# Patient Record
Sex: MALE | Race: WHITE
[De-identification: names, ages, dates, MRNs, and addresses within clinical notes are randomized per-mention and may not be internally consistent; named-entity substitution may affect disease eponyms.]

---

## 2019-11-13 ENCOUNTER — HOSPITAL ENCOUNTER (EMERGENCY)
Dept: HOSPITAL 7 - FB.ED | Age: 29
Discharge: HOME | End: 2019-11-13
Payer: COMMERCIAL

## 2019-11-13 VITALS — DIASTOLIC BLOOD PRESSURE: 84 MMHG | SYSTOLIC BLOOD PRESSURE: 138 MMHG | HEART RATE: 74 BPM

## 2019-11-13 DIAGNOSIS — S80.01XA: Primary | ICD-10-CM
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
## 2019-11-13 NOTE — EDM.PDOC
ED HPI GENERAL MEDICAL PROBLEM





- General


Stated Complaint: KNEE ISSUE


Time Seen by Provider: 11/13/19 23:00


Source of Information: Reports: Patient


History Limitations: Reports: No Limitations





- History of Present Illness


INITIAL COMMENTS - FREE TEXT/NARRATIVE: 


29 you male with pain,tightness and swelling of the knee,since Saturday. he had 

been playing around Friday night. he has also noted discoloration to the back 

of the knee and he is worried about a blood clot.Denies systemic symptoms or 

SOB or chest pain.He endorses no recent travel or immobilization or surgery.





- Related Data


 Allergies











Allergy/AdvReac Type Severity Reaction Status Date / Time


 


No Known Allergies Allergy   Verified 11/13/19 23:29











Home Meds: 


 Home Meds





NK [No Known Home Meds]  12/08/16 [History]











Review of Systems





- Review of Systems


Review Of Systems: Comprehensive ROS is negative, except as noted in HPI.





ED EXAM, GENERAL





- Physical Exam


Exam: See Below


Exam Limited By: Intoxication


General Appearance: Alert, WD/WN


Extremities: Normal Range of Motion, Other (some echymosis of the right 

popliteal fossa.).  No: Non-Tender, Pedal Edema, Yoko's Sign, Increased Warmth


Neurological: Alert


Psychiatric: Normal Affect





Course





- Orders/Labs/Meds


Labs: 


 Laboratory Tests











  11/13/19 Range/Units





  23:07 


 


D-Dimer, Quantitative  0.52  (0.0-0.59)  mg/LFEU














Departure





- Departure


Time of Disposition: 23:33


Disposition: Home, Self-Care 01


Condition: Good


Clinical Impression: 


 Ecchymosis








- Discharge Information


Referrals: 


Henna Maki NP [Primary Care Provider] - 





- Problem List & Annotations


(1) Ecchymosis


SNOMED Code(s): 223613257


   Code(s): R58 - HEMORRHAGE, NOT ELSEWHERE CLASSIFIED   Status: Acute   

Current Visit: No   





- Problem List Review


Problem List Initiated/Reviewed/Updated: Yes





- Assessment/Plan


Plan: 


reassurance